# Patient Record
(demographics unavailable — no encounter records)

---

## 2024-10-07 NOTE — PHYSICAL EXAM
[General Appearance - Well Developed] : well developed [Normal Appearance] : normal appearance [Well Groomed] : well groomed [General Appearance - Well Nourished] : well nourished [No Deformities] : no deformities [General Appearance - In No Acute Distress] : no acute distress [Normal Conjunctiva] : the conjunctiva exhibited no abnormalities [Eyelids - No Xanthelasma] : the eyelids demonstrated no xanthelasmas [Normal Oral Mucosa] : normal oral mucosa [No Oral Pallor] : no oral pallor [No Oral Cyanosis] : no oral cyanosis [Normal Jugular Venous A Waves Present] : normal jugular venous A waves present [Normal Jugular Venous V Waves Present] : normal jugular venous V waves present [No Jugular Venous [place] A Waves] : no jugular venous [place] A waves [Respiration, Rhythm And Depth] : normal respiratory rhythm and effort [Exaggerated Use Of Accessory Muscles For Inspiration] : no accessory muscle use [Abdomen Soft] : soft [Auscultation Breath Sounds / Voice Sounds] : lungs were clear to auscultation bilaterally [Abdomen Tenderness] : non-tender [Abdomen Mass (___ Cm)] : no abdominal mass palpated [Abnormal Walk] : normal gait [Gait - Sufficient For Exercise Testing] : the gait was sufficient for exercise testing [Nail Clubbing] : no clubbing of the fingernails [Cyanosis, Localized] : no localized cyanosis [Petechial Hemorrhages (___cm)] : no petechial hemorrhages [Skin Color & Pigmentation] : normal skin color and pigmentation [] : no rash [No Venous Stasis] : no venous stasis [Skin Lesions] : no skin lesions [No Skin Ulcers] : no skin ulcer [No Xanthoma] : no  xanthoma was observed [Oriented To Time, Place, And Person] : oriented to person, place, and time [Affect] : the affect was normal [Mood] : the mood was normal [No Anxiety] : not feeling anxious [Normal Rate] : normal [Normal S1] : normal S1 [Normal S2] : normal S2 [S3] : no S3 [S4] : no S4 [No Murmur] : no murmurs heard [Right Carotid Bruit] : no bruit heard over the right carotid [Left Carotid Bruit] : no bruit heard over the left carotid [Right Femoral Bruit] : no bruit heard over the right femoral artery [Left Femoral Bruit] : no bruit heard over the left femoral artery [2+] : left 2+ [No Abnormalities] : the abdominal aorta was not enlarged and no bruit was heard [No Pitting Edema] : no pitting edema present

## 2024-10-07 NOTE — DISCUSSION/SUMMARY
[Cardiomyopathy] : cardiomyopathy [Coronary Artery Disease] : coronary artery disease [Echocardiogram] : echocardiogram [Systolic Heart Failure] : systolic heart failure [Hyperlipidemia] : hyperlipidemia [Stress Test Adenosine] : an adenosine stress test [Known CAD] : known coronary artery disease [<70] : goal is <70 [At Goal] : The LDL is at goal [Hypertension] : hypertension [Responding to Treatment] : responding to treatment [Stable] : stable [None] : There are no changes in medication management [Minutes Spent: ___] : for [unfilled] ~Uminutes [de-identified] : ICD f/u per Dr Mendes [de-identified] : s/p MI,stents, see nst 2022, see CTA 2023 patent stents [de-identified] : pt requests f/u cta in fututre [de-identified] : refer to HF team [de-identified] : see meds [de-identified] : episode of confusion. [de-identified] : carotid dopplers [FreeTextEntry2] : multiple questions, reviewed cardiac history

## 2024-12-05 NOTE — HISTORY OF PRESENT ILLNESS
[de-identified] : Presents for BP check, labs, and general follow-up.  States feeling generally well.

## 2025-04-07 NOTE — HISTORY OF PRESENT ILLNESS
[de-identified] : Presents for BP check, labs, and general follow-up.  States feeling well.  Cardiology and Urology F/Us upcoming.

## 2025-04-14 NOTE — DISCUSSION/SUMMARY
[Cardiomyopathy] : cardiomyopathy [Coronary Artery Disease] : coronary artery disease [Echocardiogram] : echocardiogram [Systolic Heart Failure] : systolic heart failure [Hyperlipidemia] : hyperlipidemia [Stress Test Adenosine] : an adenosine stress test [Known CAD] : known coronary artery disease [<70] : goal is <70 [At Goal] : The LDL is at goal [Hypertension] : hypertension [Responding to Treatment] : responding to treatment [Stable] : stable [None] : There are no changes in medication management [Minutes Spent: ___] : for [unfilled] ~Uminutes [de-identified] : ICD f/u per Dr Mendes [de-identified] : s/p MI,stents, see nst 2022, see CTA 2023 patent stents [de-identified] : pt requests f/u cta in fututre [de-identified] : refer to HF team, discussed before with him, pt claims amenable now as EPS suggested this as well [de-identified] : see meds [de-identified] : episode of confusion. [de-identified] : carotid dopplers [FreeTextEntry2] : multiple questions, reviewed cardiac history

## 2025-04-14 NOTE — REASON FOR VISIT
[Follow-Up - Clinic] : a clinic follow-up of [Hypertension] : hypertension [Palpitations] : palpitations [FreeTextEntry2] :  pt had MI/back pain 4/16/16, Dr Ng multiple stents, no back pain since, had cmp, icd 4/18/19, still bikes , pt relocated from [place], now feels well, pt rides bike without sx of sscp or sob

## 2025-04-14 NOTE — PHYSICAL EXAM
[General Appearance - Well Developed] : well developed [Normal Appearance] : normal appearance [Well Groomed] : well groomed [General Appearance - Well Nourished] : well nourished [No Deformities] : no deformities [General Appearance - In No Acute Distress] : no acute distress [Normal Conjunctiva] : the conjunctiva exhibited no abnormalities [Eyelids - No Xanthelasma] : the eyelids demonstrated no xanthelasmas [Normal Oral Mucosa] : normal oral mucosa [No Oral Pallor] : no oral pallor [No Oral Cyanosis] : no oral cyanosis [Normal Jugular Venous A Waves Present] : normal jugular venous A waves present [Normal Jugular Venous V Waves Present] : normal jugular venous V waves present [No Jugular Venous [place] A Waves] : no jugular venous [place] A waves [Respiration, Rhythm And Depth] : normal respiratory rhythm and effort [Exaggerated Use Of Accessory Muscles For Inspiration] : no accessory muscle use [Auscultation Breath Sounds / Voice Sounds] : lungs were clear to auscultation bilaterally [Abdomen Soft] : soft [Abdomen Tenderness] : non-tender [Abdomen Mass (___ Cm)] : no abdominal mass palpated [Abnormal Walk] : normal gait [Gait - Sufficient For Exercise Testing] : the gait was sufficient for exercise testing [Nail Clubbing] : no clubbing of the fingernails [Cyanosis, Localized] : no localized cyanosis [Petechial Hemorrhages (___cm)] : no petechial hemorrhages [Skin Color & Pigmentation] : normal skin color and pigmentation [] : no rash [No Venous Stasis] : no venous stasis [Skin Lesions] : no skin lesions [No Skin Ulcers] : no skin ulcer [No Xanthoma] : no  xanthoma was observed [Oriented To Time, Place, And Person] : oriented to person, place, and time [Affect] : the affect was normal [Mood] : the mood was normal [No Anxiety] : not feeling anxious [Normal Rate] : normal [Normal S1] : normal S1 [Normal S2] : normal S2 [No Murmur] : no murmurs heard [2+] : left 2+ [No Abnormalities] : the abdominal aorta was not enlarged and no bruit was heard [No Pitting Edema] : no pitting edema present [S3] : no S3 [S4] : no S4 [Right Carotid Bruit] : no bruit heard over the right carotid [Left Carotid Bruit] : no bruit heard over the left carotid [Right Femoral Bruit] : no bruit heard over the right femoral artery [Left Femoral Bruit] : no bruit heard over the left femoral artery

## 2025-04-24 NOTE — CARDIOLOGY SUMMARY
[de-identified] :  2/2025 device interrogation: Underlying NSR,  [de-identified] : 4/14/25 TTE: LVIDd 5.2, Pw/Sd 1.1/1.1, EF 34%, mld LVH, grade I DD, nl RVSF, TAPSE 2.0, mld LAE, mld AI, mld MR, mld-mod TR, PASP 32mmHg  11/2017, Mild MR, Normal LA size, Moderate to severe LVEF 30-35%%.  [de-identified] : PCI to LAD and LCx

## 2025-04-24 NOTE — PHYSICAL EXAM
[Well Nourished] : well nourished [No Acute Distress] : no acute distress [No Xanthelasma] : no xanthelasma [Normal S1, S2] : normal S1, S2 [No Gallop] : no gallop [Clear Lung Fields] : clear lung fields [Good Air Entry] : good air entry [Soft] : abdomen soft [Non Tender] : non-tender [Normal Gait] : normal gait [No Edema] : no edema [No Rash] : no rash [Moves all extremities] : moves all extremities [Normal] : alert and oriented, normal memory [Alert and Oriented] : alert and oriented [de-identified] : JVP ~6cm of H2O

## 2025-04-24 NOTE — HISTORY OF PRESENT ILLNESS
[FreeTextEntry1] : Mr. Mejia is a 73 year old man with PMH of an ischemic cardiomyopathy, s/p ICD, CAD s/p PCI (last in 2016 to LAD and LCx), HTN, who presents today for initial consultation of his cardiomyopathy.  previously followed by Dr. Sanchez, primary cardiologist Dr. Cortez.    He has had longstanding ischemic cardiomyopathy since April 2016 and had been following with Dr. Sanchez last seen n 2020, but seems was lost to HF clinic. Has had routine f/u care with Dr. Cortez. Has had CTA February 2024 demonstrating patent stents. Had recent device interrogation in February 2025 with Dr. Mendes, no events at that time was suggested he re-established care with HF clinic for GDMT optimization. Had recent TTE on 4/14/25 demonstrating ongoing LVD, mld-mod TR.   He reports an ET as he bikes regularly ~10miles did this around 7months ago. He has never had hospitalizations for HF over the past year.   He presents to clinic today and states he feels well from a HF standpoint. His home BP readings have ranged ~120-130's. He reports an AT unlimited on flat ground without limitations and states his weight has been stable ranging without use of loop diuretics.  Otherwise, He denies CP, SOB at rest, orthopnea, PND, LH/dizziness, abdominal discomfort, LE edema, palpitations, and syncope. His appetite is normal and her/his bowel and bladder habits are unchanged. He has not had an ICD discharge. He has been limiting fluid and sodium in his diet and taking his medications as directed. He does not use NSAIDs.

## 2025-04-24 NOTE — DISCUSSION/SUMMARY
[FreeTextEntry1] : 73 year old man with PMH of an ischemic cardiomyopathy, s/p ICD, CAD s/p PCI (last in 2016 to LAD and LCx), HTN, who presents to clinic for initial consultation of his  cardiomyopathy. He is ACC/AHA Stage B-C HF, endorsing NYHA Class I-II symptoms. He appears euvolemic on exam, functionally robust with room for Optimization of neurohormonal blockade.

## 2025-04-24 NOTE — ASSESSMENT
[FreeTextEntry1] : HFrEF -Etiology: Ischemic -GDMT: Currently on Losartan 25mg QD, Toprol 25mg QD and spironolactone 25mg QD; Will stop Losartan and Start Entresto 24-26mg BID today. Eventual SGLT2i at subsequent visits  -Diuretics: Euvolemic off loop diuretics -Device: has single chamber ICD -Labs: 4/17/25 K+ 4.8, BUN/Scr 18/1.0,. Will repeat labs in 7-10 days with proBNP after initiation of ARNI - Would benefit from cardiac rehab, will provide list   #CAD Last PCI in 2016 to LAD - Currently on asa, Atorvastatin and BB as above - Follows with Dr. Cortez  #HTN -GDMT as above  RTC in 2-3 weeks via tele health with HF NP and with Dr. Hammond in 3 months.

## 2025-07-07 NOTE — ASSESSMENT
[FreeTextEntry1] : Hemodynamically stable with acceptable BP Lab profiles drawn in office and sent Note mild orthostatic changes - to F/U with Cardiology; also discussed fall prevention

## 2025-07-07 NOTE — HISTORY OF PRESENT ILLNESS
[de-identified] : Presents for BP check, labs, and general follow-up.  States feels generally well; does describe some dizziness on standing which appears to have started with new medication.

## 2025-07-16 NOTE — HISTORY OF PRESENT ILLNESS
[FreeTextEntry1] : Mr. Mejia is a 73 year old man with PMH of an ischemic cardiomyopathy, s/p ICD, CAD s/p PCI (last in 2016 to LAD and LCx), HTN, who presents today for initial consultation of his cardiomyopathy.  previously followed by Dr. Sanchez, primary cardiologist Dr. Cortez.    He has had longstanding ischemic cardiomyopathy since April 2016 and had been following with Dr. Sanchez last seen n 2020, but seems was lost to HF clinic. Has had routine f/u care with Dr. Cortez. Has had CTA February 2024 demonstrating patent stents. Had recent device interrogation in February 2025 with Dr. Mendes, no events at that time was suggested he re-established care with HF clinic for GDMT optimization. Had recent TTE on 4/14/25 demonstrating ongoing LVD, mld-mod TR.   He presents to clinic today and states he feels well from a HF standpoint. His home BP readings have ranged ~100-110's. He reports an AT unlimited on flat ground without limitations and states his weight has been stable ranging without use of loop diuretics. His primary issue is he is having some dizziness periodically with positional changes the past couple weeks. He has continue to do bicycling about 5-10 miles as long as weather permits. He thinks he is not hydrating enough  Otherwise, He denies CP, SOB at rest, orthopnea, PND, LH/dizziness, abdominal discomfort, LE edema, palpitations, and syncope. His appetite is normal and his bowel and bladder habits are unchanged. He has not had an ICD discharge. He has been limiting fluid and sodium in his diet and taking his medications as directed. He does not use NSAIDs.

## 2025-07-16 NOTE — DISCUSSION/SUMMARY
[FreeTextEntry1] : 73 year old man with PMH of an ischemic cardiomyopathy, s/p ICD, CAD s/p PCI (last in 2016 to LAD and LCx), HTN, who presents to clinic for f/u for cardiomyopathy.  He is ACC/AHA Stage C HF, endorsing NYHA Class I-II symptoms. He is endorsing some orthostasis symptoms, likely low-euvolemic on exam, normotensive on exam, with robust AT.

## 2025-07-16 NOTE — CARDIOLOGY SUMMARY
[de-identified] :  2/2025 device interrogation: Underlying NSR,  [de-identified] : 4/14/25 TTE: LVIDd 5.2, Pw/Sd 1.1/1.1, EF 34%, mld LVH, grade I DD, nl RVSF, TAPSE 2.0, mld LAE, mld AI, mld MR, mld-mod TR, PASP 32mmHg  11/2017, Mild MR, Normal LA size, Moderate to severe LVEF 30-35%%.  [de-identified] : PCI to LAD and LCx

## 2025-07-16 NOTE — PHYSICAL EXAM
[Well Nourished] : well nourished [No Acute Distress] : no acute distress [No Xanthelasma] : no xanthelasma [Normal S1, S2] : normal S1, S2 [No Gallop] : no gallop [Clear Lung Fields] : clear lung fields [Good Air Entry] : good air entry [Soft] : abdomen soft [Non Tender] : non-tender [Normal Gait] : normal gait [No Edema] : no edema [No Rash] : no rash [Moves all extremities] : moves all extremities [Normal] : alert and oriented, normal memory [Alert and Oriented] : alert and oriented [de-identified] : JVP ~6cm of H2O

## 2025-07-16 NOTE — ASSESSMENT
[FreeTextEntry1] : HFrEF -Etiology: Ischemic -GDMT: c/w entresto 97/103mg BID, Toprol XL 25mg QD (limited by RHR 50-60) and spironolactone 25mg QD. -previously discussed to initiate SGLT2i but he has no interest in it and he does not want more medication to his regimen despite education provided. Will defer for now -He has insurance complication for his ARNi and unable to receive patient assistance. Will find alternative way to help with cost of ARNi. Should the cost be prohibitive, switch back to losartan -Should his orthostasis continue to persist, can consider reducing entresto to mod dose -Diuretics: Euvolemic off loop diuretics -Device: has single chamber ICD -Labs 7/7/25: K4.7, BUN/Cr 23/1.23, proBNP 246 (5/5/25) - Would benefit from cardiac rehab, however patient has no interest and rather bikes -Advise patient to increase hydration up to 3L for his orthostasis  #CAD Last PCI in 2016 to LAD - Currently on asa, Atorvastatin and BB as above - Follows with Dr. Cortez  #HTN -GDMT as above  See NP telehealth in 4 months and Dr. Hammond in 8 months